# Patient Record
Sex: FEMALE | Race: BLACK OR AFRICAN AMERICAN | NOT HISPANIC OR LATINO | Employment: FULL TIME | URBAN - METROPOLITAN AREA
[De-identification: names, ages, dates, MRNs, and addresses within clinical notes are randomized per-mention and may not be internally consistent; named-entity substitution may affect disease eponyms.]

---

## 2017-10-30 ENCOUNTER — HOSPITAL ENCOUNTER (EMERGENCY)
Facility: HOSPITAL | Age: 37
Discharge: HOME | End: 2017-10-30
Attending: EMERGENCY MEDICINE
Payer: COMMERCIAL

## 2017-10-30 VITALS
RESPIRATION RATE: 17 BRPM | OXYGEN SATURATION: 100 % | DIASTOLIC BLOOD PRESSURE: 62 MMHG | HEART RATE: 89 BPM | TEMPERATURE: 98 F | BODY MASS INDEX: 28.99 KG/M2 | HEIGHT: 65 IN | WEIGHT: 174 LBS | SYSTOLIC BLOOD PRESSURE: 121 MMHG

## 2017-10-30 DIAGNOSIS — Z34.90 PREGNANCY, UNSPECIFIED GESTATIONAL AGE: Primary | ICD-10-CM

## 2017-10-30 DIAGNOSIS — J06.9 UPPER RESPIRATORY TRACT INFECTION, UNSPECIFIED TYPE: ICD-10-CM

## 2017-10-30 LAB
B-HCG UR QL: POSITIVE
BILIRUB UR QL STRIP: NEGATIVE
CLARITY UR REFRACT.AUTO: CLEAR
COLOR UR AUTO: YELLOW
GLUCOSE UR QL STRIP: NEGATIVE
HGB UR QL STRIP: NEGATIVE
KETONES UR QL STRIP: NEGATIVE
LEUKOCYTE ESTERASE UR QL STRIP: NEGATIVE
NITRITE UR QL STRIP: NEGATIVE
PH UR STRIP: 7 [PH] (ref 5–8)
PROT UR QL STRIP: NEGATIVE
SP GR UR STRIP: 1 (ref 1–1.03)
URN SPEC COLLECT METH UR: NORMAL
UROBILINOGEN UR STRIP-ACNC: NEGATIVE EU/DL

## 2017-10-30 PROCEDURE — 81025 URINE PREGNANCY TEST: CPT

## 2017-10-30 PROCEDURE — 81003 URINALYSIS AUTO W/O SCOPE: CPT

## 2017-10-30 PROCEDURE — 99283 EMERGENCY DEPT VISIT LOW MDM: CPT | Mod: 25

## 2017-10-30 PROCEDURE — 25000003 PHARM REV CODE 250: Performed by: EMERGENCY MEDICINE

## 2017-10-30 RX ORDER — ACETAMINOPHEN 500 MG
1000 TABLET ORAL
Status: COMPLETED | OUTPATIENT
Start: 2017-10-30 | End: 2017-10-30

## 2017-10-30 RX ADMIN — ACETAMINOPHEN 1000 MG: 500 TABLET ORAL at 03:10

## 2017-10-30 NOTE — ED NOTES
Patient identifiers verified and correct for Long Martin.    LOC: The patient is awake, alert and aware of environment with an appropriate affect, the patient is oriented x 3 and speaking appropriately.  APPEARANCE: Patient resting comfortably and in no acute distress, patient is clean and well groomed, patient's clothing is properly fastened.  SKIN: The skin is warm and dry, color consistent with ethnicity, patient has normal skin turgor and moist mucus membranes, skin intact, no breakdown or bruising noted.  MUSCULOSKELETAL: Patient moving all extremities spontaneously, no obvious swelling or deformities noted.  RESPIRATORY: Airway is open and patent, respirations are spontaneous, patient has a normal effort and rate, no accessory muscle use noted, clear bilateral breath sounds noted through out the chest.  ABDOMEN: Soft and non tender to palpation, no distention noted, normoactive bowel sounds present in all four quadrants.

## 2017-10-30 NOTE — ED TRIAGE NOTES
PT reports lower abdominal, nausea, lower back pain, frequent urination, nasal congestion and watery eyes x 3 days. Pt denies vomiting or diarrhea.

## 2017-10-30 NOTE — ED PROVIDER NOTES
Encounter Date: 10/30/2017       History     Chief Complaint   Patient presents with    Abdominal Pain     PT reports lower abdominal, nausea, lower back pain, frequent urination, nasal congestion and watery eyes x 3 days. Pt denies vomiting or diarrhea.     Patient presents with occasional lower abdominal pain nausea and mild low back pain.  She was supposed to start her menstrual cycle a few days ago and has not started.  No aggravating or relieving factors.  She denies vaginal bleeding and vaginal discharge.  She also has some nasal congestion and watery eyes for the past 3 days.  She reports no chest pain or shortness of breath.          Review of patient's allergies indicates:  No Known Allergies  History reviewed. No pertinent past medical history.  History reviewed. No pertinent surgical history.  History reviewed. No pertinent family history.  Social History   Substance Use Topics    Smoking status: Never Smoker    Smokeless tobacco: Never Used    Alcohol use Yes      Comment: occasionally     Review of Systems   Constitutional: Negative for fever.   HENT: Positive for congestion. Negative for sore throat.    Respiratory: Negative for shortness of breath.    Cardiovascular: Negative for chest pain.   Gastrointestinal: Negative for nausea.   Genitourinary: Negative for dysuria, vaginal bleeding, vaginal discharge and vaginal pain.   Musculoskeletal: Negative for back pain.   Skin: Negative for rash.   Neurological: Negative for weakness.   Hematological: Does not bruise/bleed easily.       Physical Exam     Initial Vitals [10/30/17 1433]   BP Pulse Resp Temp SpO2   121/62 96 20 97.6 °F (36.4 °C) 100 %      MAP       81.67         Physical Exam    Nursing note and vitals reviewed.  Constitutional: She appears well-developed and well-nourished.   HENT:   Head: Normocephalic and atraumatic.   Eyes: Conjunctivae and EOM are normal.   Neck: Normal range of motion. Neck supple.   Cardiovascular: Normal rate,  regular rhythm, normal heart sounds and intact distal pulses.   Pulmonary/Chest: Breath sounds normal. No respiratory distress. She has no wheezes. She has no rhonchi. She has no rales.   Abdominal: Soft. Bowel sounds are normal. She exhibits no distension and no mass. There is no tenderness. There is no rebound and no guarding.   Musculoskeletal: Normal range of motion.   Neurological: She is alert and oriented to person, place, and time. She has normal strength.   Skin: Skin is warm and dry.   Psychiatric: She has a normal mood and affect. Thought content normal.         ED Course   Procedures  Labs Reviewed   URINALYSIS   PREGNANCY TEST, URINE RAPID             Medical Decision Making:   ED Management:  Patient LOOKS great, she does have a positive pregnancy test and will follow up with OB/GYN.  Her menstrual cycles are irregular so I am not sure what her est due date is at this time.  This can be worked up by OB/GYN on an outpatient basis patient does not have any vaginal bleeding or abdominal pain on exam.                   ED Course      Clinical Impression:   The primary encounter diagnosis was Pregnancy, unspecified gestational age. A diagnosis of Upper respiratory tract infection, unspecified type was also pertinent to this visit.    Disposition:   Disposition: Discharged  Condition: Stable                        Anette Guerra Do, MD  10/30/17 0759

## 2017-10-30 NOTE — DISCHARGE INSTRUCTIONS
YOU CAN TAKE SUDAFED OR TYLENOL SINUS OR TYLENOL COLD AND SINUS/COLD FLU AND ROBITUSSIN DURING PREGNANCY.  DO NOT TAKE ANY IBUPROFEN PRODUCTS DURING PREGNANCY

## 2018-03-27 ENCOUNTER — HOSPITAL ENCOUNTER (OUTPATIENT)
Facility: HOSPITAL | Age: 38
Discharge: HOME | End: 2018-03-28
Attending: INTERNAL MEDICINE | Admitting: INTERNAL MEDICINE
Payer: COMMERCIAL

## 2018-03-27 ENCOUNTER — HOSPITAL ENCOUNTER (EMERGENCY)
Facility: HOSPITAL | Age: 38
Discharge: ANOTHER HOSPITAL | End: 2018-03-27
Attending: SURGERY
Payer: COMMERCIAL

## 2018-03-27 VITALS
DIASTOLIC BLOOD PRESSURE: 81 MMHG | WEIGHT: 214.75 LBS | HEART RATE: 40 BPM | HEIGHT: 66 IN | SYSTOLIC BLOOD PRESSURE: 136 MMHG | BODY MASS INDEX: 34.51 KG/M2 | TEMPERATURE: 98 F | RESPIRATION RATE: 23 BRPM | OXYGEN SATURATION: 97 %

## 2018-03-27 DIAGNOSIS — R91.8 PULMONARY NODULES/LESIONS, MULTIPLE: ICD-10-CM

## 2018-03-27 DIAGNOSIS — J81.1 PULMONARY EDEMA: Primary | ICD-10-CM

## 2018-03-27 DIAGNOSIS — J90 PLEURAL EFFUSION DUE TO ANOTHER DISORDER: ICD-10-CM

## 2018-03-27 DIAGNOSIS — J81.0 ACUTE PULMONARY EDEMA: Primary | ICD-10-CM

## 2018-03-27 DIAGNOSIS — I34.0 NONRHEUMATIC MITRAL VALVE INSUFFICIENCY: ICD-10-CM

## 2018-03-27 DIAGNOSIS — R06.02 SOB (SHORTNESS OF BREATH): ICD-10-CM

## 2018-03-27 DIAGNOSIS — R60.9 EDEMA: ICD-10-CM

## 2018-03-27 LAB
ALBUMIN SERPL BCP-MCNC: 3 G/DL
ALBUMIN SERPL BCP-MCNC: 3.5 G/DL
ALLENS TEST: ABNORMAL
ALP SERPL-CCNC: 111 U/L
ALP SERPL-CCNC: 118 U/L
ALT SERPL W/O P-5'-P-CCNC: 72 U/L
ALT SERPL W/O P-5'-P-CCNC: 77 U/L
ANION GAP SERPL CALC-SCNC: 11 MMOL/L
ANION GAP SERPL CALC-SCNC: 7 MMOL/L
AST SERPL-CCNC: 45 U/L
AST SERPL-CCNC: 56 U/L
BACTERIA #/AREA URNS AUTO: ABNORMAL /HPF
BACTERIA #/AREA URNS HPF: ABNORMAL /HPF
BASOPHILS # BLD AUTO: 0.01 K/UL
BASOPHILS # BLD AUTO: 0.02 K/UL
BASOPHILS NFR BLD: 0.2 %
BASOPHILS NFR BLD: 0.4 %
BILIRUB SERPL-MCNC: 0.3 MG/DL
BILIRUB SERPL-MCNC: 0.3 MG/DL
BILIRUB UR QL STRIP: NEGATIVE
BILIRUB UR QL STRIP: NEGATIVE
BUN SERPL-MCNC: 13 MG/DL
BUN SERPL-MCNC: 17 MG/DL
CALCIUM SERPL-MCNC: 8.8 MG/DL
CALCIUM SERPL-MCNC: 9.1 MG/DL
CHLORIDE SERPL-SCNC: 108 MMOL/L
CHLORIDE SERPL-SCNC: 111 MMOL/L
CLARITY UR REFRACT.AUTO: ABNORMAL
CLARITY UR: CLEAR
CO2 SERPL-SCNC: 23 MMOL/L
CO2 SERPL-SCNC: 25 MMOL/L
COLOR UR AUTO: YELLOW
COLOR UR: ABNORMAL
CREAT SERPL-MCNC: 0.73 MG/DL
CREAT SERPL-MCNC: 0.8 MG/DL
CREAT UR-MCNC: 27 MG/DL
D DIMER PPP FEU-MCNC: >5250 NG/ML
DELSYS: ABNORMAL
DIFFERENTIAL METHOD: ABNORMAL
DIFFERENTIAL METHOD: ABNORMAL
EOSINOPHIL # BLD AUTO: 0.2 K/UL
EOSINOPHIL # BLD AUTO: 0.2 K/UL
EOSINOPHIL NFR BLD: 3.2 %
EOSINOPHIL NFR BLD: 4.2 %
ERYTHROCYTE [DISTWIDTH] IN BLOOD BY AUTOMATED COUNT: 16.5 %
ERYTHROCYTE [DISTWIDTH] IN BLOOD BY AUTOMATED COUNT: 16.8 %
EST. GFR  (AFRICAN AMERICAN): >60 ML/MIN/1.73 M^2
EST. GFR  (AFRICAN AMERICAN): >60 ML/MIN/1.73 M^2
EST. GFR  (NON AFRICAN AMERICAN): >60 ML/MIN/1.73 M^2
EST. GFR  (NON AFRICAN AMERICAN): >60 ML/MIN/1.73 M^2
GLUCOSE SERPL-MCNC: 60 MG/DL
GLUCOSE SERPL-MCNC: 72 MG/DL
GLUCOSE UR QL STRIP: NEGATIVE
GLUCOSE UR QL STRIP: NEGATIVE
HCO3 UR-SCNC: 20.4 MMOL/L (ref 24–28)
HCT VFR BLD AUTO: 38.9 %
HCT VFR BLD AUTO: 41.1 %
HGB BLD-MCNC: 12.5 G/DL
HGB BLD-MCNC: 13.5 G/DL
HGB UR QL STRIP: ABNORMAL
HGB UR QL STRIP: ABNORMAL
KETONES UR QL STRIP: NEGATIVE
KETONES UR QL STRIP: NEGATIVE
LACTATE SERPL-SCNC: 0.6 MMOL/L
LEUKOCYTE ESTERASE UR QL STRIP: ABNORMAL
LEUKOCYTE ESTERASE UR QL STRIP: ABNORMAL
LYMPHOCYTES # BLD AUTO: 1.3 K/UL
LYMPHOCYTES # BLD AUTO: 1.4 K/UL
LYMPHOCYTES NFR BLD: 24.8 %
LYMPHOCYTES NFR BLD: 27.4 %
MCH RBC QN AUTO: 29.2 PG
MCH RBC QN AUTO: 29.6 PG
MCHC RBC AUTO-ENTMCNC: 32.1 G/DL
MCHC RBC AUTO-ENTMCNC: 32.8 G/DL
MCV RBC AUTO: 89 FL
MCV RBC AUTO: 92 FL
MICROSCOPIC COMMENT: ABNORMAL
MICROSCOPIC COMMENT: ABNORMAL
MONOCYTES # BLD AUTO: 0.8 K/UL
MONOCYTES # BLD AUTO: 0.8 K/UL
MONOCYTES NFR BLD: 15.3 %
MONOCYTES NFR BLD: 16.1 %
NEUTROPHILS # BLD AUTO: 2.6 K/UL
NEUTROPHILS # BLD AUTO: 2.9 K/UL
NEUTROPHILS NFR BLD: 53.1 %
NEUTROPHILS NFR BLD: 54.9 %
NITRITE UR QL STRIP: NEGATIVE
NITRITE UR QL STRIP: NEGATIVE
NT-PROBNP: 420 PG/ML
PCO2 BLDA: 31.7 MMHG (ref 35–45)
PH SMN: 7.42 [PH] (ref 7.35–7.45)
PH UR STRIP: 5 [PH] (ref 5–8)
PH UR STRIP: 7 [PH] (ref 5–8)
PLATELET # BLD AUTO: 273 K/UL
PLATELET # BLD AUTO: 274 K/UL
PMV BLD AUTO: 8.5 FL
PMV BLD AUTO: 8.8 FL
PO2 BLDA: 77 MMHG (ref 80–100)
POC BE: -4 MMOL/L
POC SATURATED O2: 96 % (ref 95–100)
POC TCO2: 21 MMOL/L (ref 23–27)
POTASSIUM SERPL-SCNC: 3.9 MMOL/L
POTASSIUM SERPL-SCNC: 4 MMOL/L
PROT SERPL-MCNC: 7.1 G/DL
PROT SERPL-MCNC: 7.2 G/DL
PROT UR QL STRIP: NEGATIVE
PROT UR QL STRIP: NEGATIVE
PROT UR-MCNC: <7 MG/DL
PROT/CREAT RATIO, UR: NORMAL
RBC # BLD AUTO: 4.23 M/UL
RBC # BLD AUTO: 4.62 M/UL
RBC #/AREA URNS AUTO: 3 /HPF (ref 0–4)
RBC #/AREA URNS HPF: 1 /HPF (ref 0–4)
SAMPLE: ABNORMAL
SITE: ABNORMAL
SODIUM SERPL-SCNC: 140 MMOL/L
SODIUM SERPL-SCNC: 145 MMOL/L
SP GR UR STRIP: 1.01 (ref 1–1.03)
SP GR UR STRIP: 1.01 (ref 1–1.03)
SQUAMOUS #/AREA URNS AUTO: 4 /HPF
SQUAMOUS #/AREA URNS HPF: 5 /HPF
URN SPEC COLLECT METH UR: ABNORMAL
URN SPEC COLLECT METH UR: ABNORMAL
UROBILINOGEN UR STRIP-ACNC: NEGATIVE EU/DL
UROBILINOGEN UR STRIP-ACNC: NEGATIVE EU/DL
WBC # BLD AUTO: 4.97 K/UL
WBC # BLD AUTO: 5.24 K/UL
WBC #/AREA URNS AUTO: 25 /HPF (ref 0–5)
WBC #/AREA URNS HPF: 5 /HPF (ref 0–5)

## 2018-03-27 PROCEDURE — 81000 URINALYSIS NONAUTO W/SCOPE: CPT | Mod: 91

## 2018-03-27 PROCEDURE — G0378 HOSPITAL OBSERVATION PER HR: HCPCS

## 2018-03-27 PROCEDURE — 83036 HEMOGLOBIN GLYCOSYLATED A1C: CPT

## 2018-03-27 PROCEDURE — 93010 ELECTROCARDIOGRAM REPORT: CPT | Mod: ,,, | Performed by: INTERNAL MEDICINE

## 2018-03-27 PROCEDURE — 93005 ELECTROCARDIOGRAM TRACING: CPT

## 2018-03-27 PROCEDURE — 85025 COMPLETE CBC W/AUTO DIFF WBC: CPT | Mod: 91

## 2018-03-27 PROCEDURE — 85025 COMPLETE CBC W/AUTO DIFF WBC: CPT

## 2018-03-27 PROCEDURE — 63600175 PHARM REV CODE 636 W HCPCS: Performed by: SURGERY

## 2018-03-27 PROCEDURE — 81000 URINALYSIS NONAUTO W/SCOPE: CPT

## 2018-03-27 PROCEDURE — 36415 COLL VENOUS BLD VENIPUNCTURE: CPT

## 2018-03-27 PROCEDURE — 36600 WITHDRAWAL OF ARTERIAL BLOOD: CPT

## 2018-03-27 PROCEDURE — 93306 TTE W/DOPPLER COMPLETE: CPT

## 2018-03-27 PROCEDURE — 99900035 HC TECH TIME PER 15 MIN (STAT)

## 2018-03-27 PROCEDURE — 96374 THER/PROPH/DIAG INJ IV PUSH: CPT

## 2018-03-27 PROCEDURE — 80053 COMPREHEN METABOLIC PANEL: CPT

## 2018-03-27 PROCEDURE — 80053 COMPREHEN METABOLIC PANEL: CPT | Mod: 91

## 2018-03-27 PROCEDURE — 87040 BLOOD CULTURE FOR BACTERIA: CPT | Mod: 59

## 2018-03-27 PROCEDURE — 25500020 PHARM REV CODE 255: Performed by: SURGERY

## 2018-03-27 PROCEDURE — 87086 URINE CULTURE/COLONY COUNT: CPT

## 2018-03-27 PROCEDURE — G0379 DIRECT REFER HOSPITAL OBSERV: HCPCS

## 2018-03-27 PROCEDURE — 25000003 PHARM REV CODE 250: Performed by: SURGERY

## 2018-03-27 PROCEDURE — 27000221 HC OXYGEN, UP TO 24 HOURS

## 2018-03-27 PROCEDURE — 96361 HYDRATE IV INFUSION ADD-ON: CPT

## 2018-03-27 PROCEDURE — 85379 FIBRIN DEGRADATION QUANT: CPT

## 2018-03-27 PROCEDURE — 63600175 PHARM REV CODE 636 W HCPCS: Performed by: INTERNAL MEDICINE

## 2018-03-27 PROCEDURE — 83605 ASSAY OF LACTIC ACID: CPT

## 2018-03-27 PROCEDURE — 99285 EMERGENCY DEPT VISIT HI MDM: CPT | Mod: 25

## 2018-03-27 PROCEDURE — 84156 ASSAY OF PROTEIN URINE: CPT

## 2018-03-27 PROCEDURE — 25000003 PHARM REV CODE 250: Performed by: INTERNAL MEDICINE

## 2018-03-27 PROCEDURE — 83880 ASSAY OF NATRIURETIC PEPTIDE: CPT

## 2018-03-27 PROCEDURE — 82803 BLOOD GASES ANY COMBINATION: CPT

## 2018-03-27 PROCEDURE — 93306 TTE W/DOPPLER COMPLETE: CPT | Mod: 26,,, | Performed by: INTERNAL MEDICINE

## 2018-03-27 RX ORDER — FUROSEMIDE 10 MG/ML
40 INJECTION INTRAMUSCULAR; INTRAVENOUS
Status: COMPLETED | OUTPATIENT
Start: 2018-03-27 | End: 2018-03-27

## 2018-03-27 RX ORDER — HYDRALAZINE HYDROCHLORIDE 20 MG/ML
10 INJECTION INTRAMUSCULAR; INTRAVENOUS EVERY 8 HOURS PRN
Status: DISCONTINUED | OUTPATIENT
Start: 2018-03-27 | End: 2018-03-28 | Stop reason: HOSPADM

## 2018-03-27 RX ORDER — HYDROCODONE BITARTRATE AND ACETAMINOPHEN 5; 325 MG/1; MG/1
1 TABLET ORAL
Status: COMPLETED | OUTPATIENT
Start: 2018-03-27 | End: 2018-03-27

## 2018-03-27 RX ORDER — FUROSEMIDE 10 MG/ML
20 INJECTION INTRAMUSCULAR; INTRAVENOUS ONCE
Status: COMPLETED | OUTPATIENT
Start: 2018-03-27 | End: 2018-03-27

## 2018-03-27 RX ORDER — PRENATAL WITH FERROUS FUM AND FOLIC ACID 3080; 920; 120; 400; 22; 1.84; 3; 20; 10; 1; 12; 200; 27; 25; 2 [IU]/1; [IU]/1; MG/1; [IU]/1; MG/1; MG/1; MG/1; MG/1; MG/1; MG/1; UG/1; MG/1; MG/1; MG/1; MG/1
1 TABLET ORAL DAILY
Status: DISCONTINUED | OUTPATIENT
Start: 2018-03-28 | End: 2018-03-28 | Stop reason: HOSPADM

## 2018-03-27 RX ORDER — IBUPROFEN 200 MG
16 TABLET ORAL
Status: DISCONTINUED | OUTPATIENT
Start: 2018-03-27 | End: 2018-03-28 | Stop reason: HOSPADM

## 2018-03-27 RX ORDER — GLUCAGON 1 MG
1 KIT INJECTION
Status: DISCONTINUED | OUTPATIENT
Start: 2018-03-27 | End: 2018-03-28 | Stop reason: HOSPADM

## 2018-03-27 RX ORDER — ACETAMINOPHEN 325 MG/1
650 TABLET ORAL EVERY 4 HOURS PRN
Status: DISCONTINUED | OUTPATIENT
Start: 2018-03-27 | End: 2018-03-28 | Stop reason: HOSPADM

## 2018-03-27 RX ORDER — IBUPROFEN 600 MG/1
600 TABLET ORAL
Status: COMPLETED | OUTPATIENT
Start: 2018-03-27 | End: 2018-03-27

## 2018-03-27 RX ORDER — IBUPROFEN 200 MG
24 TABLET ORAL
Status: DISCONTINUED | OUTPATIENT
Start: 2018-03-27 | End: 2018-03-28 | Stop reason: HOSPADM

## 2018-03-27 RX ORDER — CLONIDINE HYDROCHLORIDE 0.1 MG/1
0.1 TABLET ORAL
Status: COMPLETED | OUTPATIENT
Start: 2018-03-27 | End: 2018-03-27

## 2018-03-27 RX ORDER — SODIUM CHLORIDE 0.9 % (FLUSH) 0.9 %
5 SYRINGE (ML) INJECTION
Status: DISCONTINUED | OUTPATIENT
Start: 2018-03-27 | End: 2018-03-28 | Stop reason: HOSPADM

## 2018-03-27 RX ADMIN — SODIUM CHLORIDE 1000 ML: 0.9 INJECTION, SOLUTION INTRAVENOUS at 06:03

## 2018-03-27 RX ADMIN — ACETAMINOPHEN 650 MG: 325 TABLET, FILM COATED ORAL at 03:03

## 2018-03-27 RX ADMIN — IBUPROFEN 600 MG: 600 TABLET, FILM COATED ORAL at 11:03

## 2018-03-27 RX ADMIN — FUROSEMIDE 40 MG: 10 INJECTION, SOLUTION INTRAMUSCULAR; INTRAVENOUS at 12:03

## 2018-03-27 RX ADMIN — FUROSEMIDE 20 MG: 10 INJECTION, SOLUTION INTRAMUSCULAR; INTRAVENOUS at 09:03

## 2018-03-27 RX ADMIN — ACETAMINOPHEN 650 MG: 325 TABLET, FILM COATED ORAL at 09:03

## 2018-03-27 RX ADMIN — HYDROCODONE BITARTRATE AND ACETAMINOPHEN 1 TABLET: 5; 325 TABLET ORAL at 06:03

## 2018-03-27 RX ADMIN — IOHEXOL 100 ML: 350 INJECTION, SOLUTION INTRAVENOUS at 09:03

## 2018-03-27 RX ADMIN — CLONIDINE HYDROCHLORIDE 0.1 MG: 0.1 TABLET ORAL at 11:03

## 2018-03-27 NOTE — SUBJECTIVE & OBJECTIVE
History reviewed. No pertinent past medical history.    Past Surgical History:   Procedure Laterality Date     SECTION         Review of patient's allergies indicates:  No Known Allergies    Current Facility-Administered Medications on File Prior to Encounter   Medication    [COMPLETED] cloNIDine tablet 0.1 mg    [COMPLETED] furosemide injection 40 mg    [COMPLETED] hydrocodone-acetaminophen 5-325mg per tablet 1 tablet    [COMPLETED] ibuprofen tablet 600 mg    [COMPLETED] omnipaque 350 iohexol 100 mL    [COMPLETED] sodium chloride 0.9% bolus 1,000 mL     Current Outpatient Prescriptions on File Prior to Encounter   Medication Sig    prenatal 21-iron fu-folic acid (PRENATAL COMPLETE) 14 mg iron- 400 mcg Tab Take 1 tablet by mouth once daily.     Family History     None        Social History Main Topics    Smoking status: Never Smoker    Smokeless tobacco: Never Used    Alcohol use Yes      Comment: occasionally    Drug use: No    Sexual activity: Not on file     Review of Systems   Constitutional: Negative for chills and fever.   HENT: Negative for congestion and rhinorrhea.    Eyes: Negative for photophobia and visual disturbance.   Respiratory: Positive for shortness of breath. Negative for cough, chest tightness, wheezing and stridor.    Cardiovascular: Negative for chest pain and leg swelling.   Gastrointestinal: Positive for abdominal pain. Negative for nausea and vomiting.   Genitourinary: Negative for difficulty urinating and dysuria.   Musculoskeletal: Negative for joint swelling and neck stiffness.   Skin: Negative for rash and wound.   Neurological: Positive for headaches. Negative for dizziness and light-headedness.   Psychiatric/Behavioral: Negative for agitation and behavioral problems.     Objective:     Vital Signs (Most Recent):  Temp: 98.3 °F (36.8 °C) (18 1505)  Pulse: (!) 52 (18 1505)  Resp: 19 (18 1505)  BP: (!) 170/88 (18 1505)  SpO2: 97 % (18  1505) Vital Signs (24h Range):  Temp:  [97.8 °F (36.6 °C)-98.3 °F (36.8 °C)] 98.3 °F (36.8 °C)  Pulse:  [38-52] 52  Resp:  [19-25] 19  SpO2:  [97 %-100 %] 97 %  BP: (136-170)/() 170/88     Weight: 93.9 kg (207 lb 0.2 oz)  Body mass index is 33.41 kg/m².    Physical Exam   Constitutional: She is oriented to person, place, and time. She appears well-developed and well-nourished. No distress.   HENT:   Right Ear: External ear normal.   Left Ear: External ear normal.   Nose: Nose normal.   Eyes: EOM are normal. Pupils are equal, round, and reactive to light. No scleral icterus.   Neck: Normal range of motion. Neck supple. No thyromegaly present.   Cardiovascular: Normal rate and normal heart sounds.  Exam reveals no friction rub.    No murmur heard.  Pulmonary/Chest: Effort normal and breath sounds normal. No respiratory distress. She has no wheezes. She has no rales.   Abdominal: Soft. Bowel sounds are normal. She exhibits no mass. There is no tenderness.   Obese, surgical site healing well, c/d/i   Musculoskeletal: Normal range of motion. She exhibits no edema or deformity.   Neurological: She is alert and oriented to person, place, and time. No cranial nerve deficit.   Skin: Skin is warm and dry. No pallor.         CRANIAL NERVES     CN III, IV, VI   Pupils are equal, round, and reactive to light.  Extraocular motions are normal.        Significant Labs: All pertinent labs within the past 24 hours have been reviewed.    Significant Imaging: I have reviewed and interpreted all pertinent imaging results/findings within the past 24 hours.

## 2018-03-27 NOTE — HPI
Ms. Martin is a 39 yo  woman who presented to [facility] for shortness of breath. Her symptoms started about 5 days ago and have progressively worsened. Lying flat worsens her symptoms and her symptoms improve with sitting up. She denies cough, fever, chills.  She delivered her baby via C section on 3/19 at [facility]. She went into natural labor at 39 weeks. Her baby was developing bradycardia during contractions so she was taken for C section. She does not know if she was given tocolytics or steroids. She does not believe she was given IV fluids during her hospital stay.

## 2018-03-27 NOTE — ED NOTES
Spoke to Transfer center [facility] RN, notified the need for transfer for medicine admit telemetry bed pt is un referred. Dr Lee spoke to [facility] MD Dr. Cruz prior to transfer line notification. Pt not accepted at [place].

## 2018-03-27 NOTE — H&P
[facility] Hospital Medicine  History & Physical    Patient Name: Long Martin  MRN: [id number]  Admission Date: 3/27/2018  Attending Physician: Mary Ann Cavazos MD   Primary Care Provider: Primary Doctor No         Patient information was obtained from patient and ER records.     Subjective:     Principal Problem:Pulmonary edema    Chief Complaint: No chief complaint on file.       HPI: Ms. Martin is a 37 yo  woman who presented to [facility] for shortness of breath. Her symptoms started about 5 days ago and have progressively worsened. Lying flat worsens her symptoms and her symptoms improve with sitting up. She denies cough, fever, chills.  She delivered her baby via C section on 3/19 at [facility]. She went into natural labor at 39 weeks. Her baby was developing bradycardia during contractions so she was taken for C section. She does not know if she was given tocolytics or steroids. She does not believe she was given IV fluids during her hospital stay.    History reviewed. No pertinent past medical history.    Past Surgical History:   Procedure Laterality Date     SECTION         Review of patient's allergies indicates:  No Known Allergies    Current Facility-Administered Medications on File Prior to Encounter   Medication    [COMPLETED] cloNIDine tablet 0.1 mg    [COMPLETED] furosemide injection 40 mg    [COMPLETED] hydrocodone-acetaminophen 5-325mg per tablet 1 tablet    [COMPLETED] ibuprofen tablet 600 mg    [COMPLETED] omnipaque 350 iohexol 100 mL    [COMPLETED] sodium chloride 0.9% bolus 1,000 mL     Current Outpatient Prescriptions on File Prior to Encounter   Medication Sig    prenatal 21-iron fu-folic acid (PRENATAL COMPLETE) 14 mg iron- 400 mcg Tab Take 1 tablet by mouth once daily.     Family History     None        Social History Main Topics    Smoking status: Never Smoker    Smokeless tobacco: Never Used    Alcohol use Yes      Comment:  occasionally    Drug use: No    Sexual activity: Not on file     Review of Systems   Constitutional: Negative for chills and fever.   HENT: Negative for congestion and rhinorrhea.    Eyes: Negative for photophobia and visual disturbance.   Respiratory: Positive for shortness of breath. Negative for cough, chest tightness, wheezing and stridor.    Cardiovascular: Negative for chest pain and leg swelling.   Gastrointestinal: Positive for abdominal pain. Negative for nausea and vomiting.   Genitourinary: Negative for difficulty urinating and dysuria.   Musculoskeletal: Negative for joint swelling and neck stiffness.   Skin: Negative for rash and wound.   Neurological: Positive for headaches. Negative for dizziness and light-headedness.   Psychiatric/Behavioral: Negative for agitation and behavioral problems.     Objective:     Vital Signs (Most Recent):  Temp: 98.3 °F (36.8 °C) (03/27/18 1505)  Pulse: (!) 52 (03/27/18 1505)  Resp: 19 (03/27/18 1505)  BP: (!) 170/88 (03/27/18 1505)  SpO2: 97 % (03/27/18 1505) Vital Signs (24h Range):  Temp:  [97.8 °F (36.6 °C)-98.3 °F (36.8 °C)] 98.3 °F (36.8 °C)  Pulse:  [38-52] 52  Resp:  [19-25] 19  SpO2:  [97 %-100 %] 97 %  BP: (136-170)/() 170/88     Weight: 93.9 kg (207 lb 0.2 oz)  Body mass index is 33.41 kg/m².    Physical Exam   Constitutional: She is oriented to person, place, and time. She appears well-developed and well-nourished. No distress.   HENT:   Right Ear: External ear normal.   Left Ear: External ear normal.   Nose: Nose normal.   Eyes: EOM are normal. Pupils are equal, round, and reactive to light. No scleral icterus.   Neck: Normal range of motion. Neck supple. No thyromegaly present.   Cardiovascular: Normal rate and normal heart sounds.  Exam reveals no friction rub.    No murmur heard.  Pulmonary/Chest: Effort normal and breath sounds normal. No respiratory distress. She has no wheezes. She has no rales.   Abdominal: Soft. Bowel sounds are normal. She  exhibits no mass. There is no tenderness.   Obese, surgical site healing well, c/d/i   Musculoskeletal: Normal range of motion. She exhibits no edema or deformity.   Neurological: She is alert and oriented to person, place, and time. No cranial nerve deficit.   Skin: Skin is warm and dry. No pallor.         CRANIAL NERVES     CN III, IV, VI   Pupils are equal, round, and reactive to light.  Extraocular motions are normal.        Significant Labs: All pertinent labs within the past 24 hours have been reviewed.    Significant Imaging: I have reviewed and interpreted all pertinent imaging results/findings within the past 24 hours.    Assessment/Plan:     * Pulmonary edema    CTA negative for PE but demonstrated edema which is likely the etiology of her symptoms.   My suspicion for pneumonia is low given absence of fever/cough/leukocytosis.   Possibly iatrogenic volume overload given recent surgery and hospitalization. Check echocardiogram. NTproBNP upper limit normal.  Nodules also noted?  Consult ob/gyn and pulm.   O2 sats satisfactory on room air.        Hypertension affecting pregnancy, delivered, current hospitalization    No history of hypertension. PRN labetalol and monitor.           VTE Risk Mitigation         Ordered     IP VTE LOW RISK PATIENT  Once      03/27/18 6970             Mary Ann Cavazos MD  Department of Hospital Medicine   [facility]

## 2018-03-27 NOTE — ED PROVIDER NOTES
Encounter Date: 3/27/2018       History     Chief Complaint   Patient presents with    Shortness of Breath     c/o SOB x 3 days worsening with lying flat; O2 sat 97% on RA; reports having baby via  1 week ago    Headache     L frontal HA x 3 days; denies N/V      Patient presents with chief complaint of shortness of breath especially when she is lying flat for last 3 days.  She underwent a  at [facility] and was discharged on Thursday.  She developed symptoms starting on Saturday she states that she doesn't have any chest pain or running high fever.  She had an uncomplicated .  She has no history of lung disease      The history is provided by the patient.   Shortness of Breath   This is a new problem. The average episode lasts 3 days. The problem occurs intermittently.The current episode started more than 2 days ago. The problem has been gradually worsening. Associated symptoms include orthopnea. Pertinent negatives include no fever, no cough, no wheezing and no leg swelling. She has tried nothing for the symptoms. She has had no prior hospitalizations. She has had no prior ED visits. She has had no prior ICU admissions. Associated medical issues do not include pneumonia, PE, CAD, heart failure or DVT.     Review of patient's allergies indicates:  No Known Allergies  History reviewed. No pertinent past medical history.  Past Surgical History:   Procedure Laterality Date     SECTION       History reviewed. No pertinent family history.  Social History   Substance Use Topics    Smoking status: Never Smoker    Smokeless tobacco: Never Used    Alcohol use Yes      Comment: occasionally     Review of Systems   Constitutional: Negative for activity change and fever.   HENT: Negative.    Eyes: Negative.    Respiratory: Positive for shortness of breath. Negative for cough and wheezing.    Cardiovascular: Positive for orthopnea. Negative for leg swelling.   Gastrointestinal:  Negative.    Endocrine: Negative.    Genitourinary: Negative.    Musculoskeletal: Negative.    Skin: Negative.    Allergic/Immunologic: Negative.    Hematological: Negative.    Psychiatric/Behavioral: Negative.    All other systems reviewed and are negative.      Physical Exam     Initial Vitals [03/27/18 0607]   BP Pulse Resp Temp SpO2   (!) 149/89 (!) 50 20 97.8 °F (36.6 °C) 97 %      MAP       109         Physical Exam    Nursing note and vitals reviewed.  Constitutional: She appears well-developed and well-nourished.   HENT:   Head: Normocephalic.   Eyes: Conjunctivae are normal.   Neck: Normal range of motion. Neck supple.   Cardiovascular: Normal rate and regular rhythm.   Pulmonary/Chest:   Diminished at both bases   Abdominal: Soft.   Neurological: She is alert and oriented to person, place, and time.   Skin: Skin is warm and dry.   Psychiatric: She has a normal mood and affect.         ED Course   Critical Care  Date/Time: 3/27/2018 11:08 AM  Performed by: RICARDO ROSE III  Authorized by: RICARDO ROSE III   Direct patient critical care time: 60 minutes  Additional history critical care time: 10 minutes  Ordering / reviewing critical care time: 10 minutes  Documentation critical care time: 20 minutes  Consulting other physicians critical care time: 10 minutes  Total critical care time (exclusive of procedural time) : 110 minutes        Labs Reviewed   CBC W/ AUTO DIFFERENTIAL - Abnormal; Notable for the following:        Result Value    RDW 16.8 (*)     MPV 8.5 (*)     Mono% 15.3 (*)     All other components within normal limits   COMPREHENSIVE METABOLIC PANEL - Abnormal; Notable for the following:     Chloride 111 (*)     AST 56 (*)     ALT 77 (*)     All other components within normal limits   URINALYSIS - Abnormal; Notable for the following:     Appearance, UA Hazy (*)     Occult Blood UA 2+ (*)     Leukocytes, UA 3+ (*)     All other components within normal limits   D DIMER - Abnormal;  Notable for the following:     D-Dimer >5250 (*)     All other components within normal limits   URINALYSIS MICROSCOPIC - Abnormal; Notable for the following:     WBC, UA 25 (*)     Bacteria, UA Many (*)     All other components within normal limits   CULTURE, BLOOD   CULTURE, BLOOD   LACTIC ACID, PLASMA   NT-PRO NATRIURETIC PEPTIDE     EKG Readings: (Independently Interpreted)   Rhythm: Sinus Bradycardia.          Medical Decision Making:   Initial Assessment:   Pneumonia  ED Management:  Patient has pulmonary edema from an unknown cause.  She has multiple bilateral nodules bilateral pleural effusions as well as ascites underneath the diaphragm.  She does not have a history of coronary artery disease her BNP is within normal limits her d-dimer showed was greater than 5000 but there is no evidence of any pulmonary embolus her lactic acid was normal she has a normal white cell count  impression is pulmonary edema from an unknown cause one week status post  I talked to Dr. Cruz at [facility] and he felt uncomfortable taking care of a postop OB patient and would recommend that patient go to [facility].  [facility] did not return our calls oxygen transfer center was called and they arranged transfer to the [facility].  I talked to the accepting doctor and she recommended that the patient be placed in observation for a thorough evaluation  Other:   I have discussed this case with another health care provider.                      Clinical Impression:   The primary encounter diagnosis was Acute pulmonary edema. Diagnoses of SOB (shortness of breath), Pleural effusion due to another disorder, and Pulmonary nodules/lesions, multiple were also pertinent to this visit.                           RICARDO Lee III, MD  [phone]

## 2018-03-27 NOTE — NURSING
Pt arrive to the unit by stretcher accompanied by EMS.  Assisted pt to bed. Tele monitoring initiated.  Admit assessment initiated.  Pt is AAOx4.  No distress noted.

## 2018-03-27 NOTE — ED NOTES
[facility] here for transport. Pt amb to restroom to void approx 500cc urine. Pt then ambulated to [facility] stretcher.

## 2018-03-27 NOTE — ED NOTES
PH   7.416    PCO2   31.7    PO2    77    BE   -4    HCO3     20.4    TCO2   21    sO2     96      Results reported to Dr. Claude Craighead in Respiratory alcove on 3/27/2018 @ 1247 by [facility].  ABG drawn on Room Air.

## 2018-03-27 NOTE — ED NOTES
"Pt states she had a csection done on 3/19/2018 at [facility] due to decelerations in baby's heart rate following planned induction on the 3/18. States her original due date was 3/24/2018. Pt denies complications after  procedure. States she became SOB 2 days ago and "it has gotten worse, especially when I move". Resp even non-labored at rest, diminished bilaterally at bases.   "

## 2018-03-28 VITALS
TEMPERATURE: 99 F | DIASTOLIC BLOOD PRESSURE: 81 MMHG | OXYGEN SATURATION: 97 % | WEIGHT: 207.69 LBS | HEIGHT: 66 IN | RESPIRATION RATE: 18 BRPM | HEART RATE: 57 BPM | BODY MASS INDEX: 33.38 KG/M2 | SYSTOLIC BLOOD PRESSURE: 123 MMHG

## 2018-03-28 LAB
ALBUMIN SERPL BCP-MCNC: 2.8 G/DL
ALP SERPL-CCNC: 109 U/L
ALT SERPL W/O P-5'-P-CCNC: 64 U/L
ANION GAP SERPL CALC-SCNC: 9 MMOL/L
AST SERPL-CCNC: 36 U/L
BASOPHILS # BLD AUTO: 0.02 K/UL
BASOPHILS NFR BLD: 0.4 %
BILIRUB SERPL-MCNC: 0.2 MG/DL
BUN SERPL-MCNC: 14 MG/DL
CALCIUM SERPL-MCNC: 8.8 MG/DL
CHLORIDE SERPL-SCNC: 108 MMOL/L
CO2 SERPL-SCNC: 25 MMOL/L
CREAT SERPL-MCNC: 0.8 MG/DL
DIFFERENTIAL METHOD: ABNORMAL
EOSINOPHIL # BLD AUTO: 0.3 K/UL
EOSINOPHIL NFR BLD: 4.7 %
ERYTHROCYTE [DISTWIDTH] IN BLOOD BY AUTOMATED COUNT: 16.5 %
EST. GFR  (AFRICAN AMERICAN): >60 ML/MIN/1.73 M^2
EST. GFR  (NON AFRICAN AMERICAN): >60 ML/MIN/1.73 M^2
ESTIMATED AVG GLUCOSE: 94 MG/DL
ESTIMATED PA SYSTOLIC PRESSURE: 40.49
GLUCOSE SERPL-MCNC: 74 MG/DL
HBA1C MFR BLD HPLC: 4.9 %
HCT VFR BLD AUTO: 37.7 %
HGB BLD-MCNC: 12.3 G/DL
LYMPHOCYTES # BLD AUTO: 1.5 K/UL
LYMPHOCYTES NFR BLD: 27.5 %
MCH RBC QN AUTO: 29.4 PG
MCHC RBC AUTO-ENTMCNC: 32.6 G/DL
MCV RBC AUTO: 90 FL
MITRAL VALVE REGURGITATION: ABNORMAL
MONOCYTES # BLD AUTO: 0.9 K/UL
MONOCYTES NFR BLD: 17.2 %
NEUTROPHILS # BLD AUTO: 2.7 K/UL
NEUTROPHILS NFR BLD: 50 %
PLATELET # BLD AUTO: 299 K/UL
PMV BLD AUTO: 8.7 FL
POTASSIUM SERPL-SCNC: 3.8 MMOL/L
PROT SERPL-MCNC: 6.8 G/DL
RBC # BLD AUTO: 4.18 M/UL
RETIRED EF AND QEF - SEE NOTES: 55 (ref 55–65)
SODIUM SERPL-SCNC: 142 MMOL/L
TRICUSPID VALVE REGURGITATION: ABNORMAL
WBC # BLD AUTO: 5.34 K/UL

## 2018-03-28 PROCEDURE — 25000003 PHARM REV CODE 250: Performed by: INTERNAL MEDICINE

## 2018-03-28 PROCEDURE — G0378 HOSPITAL OBSERVATION PER HR: HCPCS

## 2018-03-28 PROCEDURE — 36415 COLL VENOUS BLD VENIPUNCTURE: CPT

## 2018-03-28 PROCEDURE — 96374 THER/PROPH/DIAG INJ IV PUSH: CPT

## 2018-03-28 PROCEDURE — 85025 COMPLETE CBC W/AUTO DIFF WBC: CPT

## 2018-03-28 PROCEDURE — 80053 COMPREHEN METABOLIC PANEL: CPT

## 2018-03-28 RX ADMIN — PRENATAL VIT W/ FE FUMARATE-FA TAB 27-0.8 MG 1 TABLET: 27-0.8 TAB at 08:03

## 2018-03-28 NOTE — PROGRESS NOTES
Received report from Holley VAZQUEZ. Patient is awake and alert in bed. She reports a mild headache at this time. No distress noted. Shift assessment initiated.

## 2018-03-28 NOTE — PROGRESS NOTES
TN contacted Dr. Winter' office at [phone] to establish care and schedule a PCP f/u appt; answering machine will convey to staff. Pt will schedule the appt in 1 week. Aunt is present with pt who will provide transportation, did not want to wait until TN can schedule after 1 pm. Pt has an appt with OB/GYN, Dr. Quiros on 03/30/18.

## 2018-03-28 NOTE — PROGRESS NOTES
Discharge instructions given to patient and family at bedside. Patient and family verbalized understanding and states willingness to comply. Saline lock removed. Tele monitoring removed. Patient to family vehicle  for discharge home. Patient escorted by PCT and accompanied by family. No apparent distress noted.

## 2018-03-28 NOTE — PROGRESS NOTES
WRITTEN HEALTH INFORMATION ON PULMONARY EDEMA      Call 911  Call 911 if any of these occur:  · Chest pain  · Severe trouble breathing  · Coughing up blood  · Skin turns blue      When to call the healthcare provider  Call your the healthcare provider right away if you have any of the following:  · Unusual or irregular heartbeat  · Unable to speak full sentences before running out of breath  · Sweating more than usual

## 2018-03-28 NOTE — PROGRESS NOTES
"EDUCATION:  TN provided with educational information on Pulmonary Edema.  Information reviewed and placed in :My Healthcare Packet" to be brought home for pt to use as resource after discharge.  Information included:  signs and symptoms to look for and call the doctor if experiencing, and symptoms that may indicate a medical emergency: CALL 911.    Reminded pt things she will be responsible for to manage her healthcare at home: getting Rx filled, attending follow up appointments, and taking medication as prescribed were discussed.   Teach back method used.  All questions answered.  Patient verbalized understanding of all information.      TN informed floor nurseHolley, care management   Is complete and can proceed with discharge teaching.  "

## 2018-03-28 NOTE — PLAN OF CARE
Problem: Patient Care Overview  Goal: Plan of Care Review   03/28/18 0140   Coping/Psychosocial   Plan Of Care Reviewed With patient       Problem: Hypertensive Disorders in Pregnancy (Adult,Obstetrics,Pediatric)  Intervention: Monitor Reflexes/Neurological Status   03/28/18 0140   Safety Interventions   Medication Review/Management medications reviewed     Intervention: Monitor/Manage Seizure Activity   03/28/18 0140   Safety Interventions   Seizure Precautions clutter-free environment maintained       Goal: Signs and Symptoms of Listed Potential Problems Will be Absent, Minimized or Managed (Hypertensive Disorders in Pregnancy)  Signs and symptoms of listed potential problems will be absent, minimized or managed by discharge/transition of care (reference Hypertensive Disorders in Pregnancy (Adult,Obstetrics,Pediatric) CPG).    03/28/18 0140   Hypertensive Disorders in Pregnancy   Problems Assessed (Hypertensive Disorders in Pregnancy) all   Problems Present (Hypertensive Disorders in Pregnancy) pulmonary edema

## 2018-03-28 NOTE — PROGRESS NOTES
Follow-up Information     Mary Ann Quiros MD In 3 days.    Specialty:  Obstetrics  Why:  Outpatient Services OB/GYN Follow-Up Pt will schedule an appointment.  Contact information:  [address] [place] [address] [phone]             Cheikh Johnston MD.    Specialty:  Family Medicine  Why:  Outpatient Services PCP Follow-Up; Pt will schedule appointment in 1 week.  Contact information:  [address] [place] [address] [phone]               PLEASE BRING TO ALL FOLLOW UP APPOINTMENTS:   A COPY YOUR DISCHARGE INSTRUCTIONS, Any new MEDICINES YOU ARE CURRENTLY TAKING IN THEIR ORIGINAL BOTTLES  And IDENTIFICATION AND INSURANCE CARD     **PLEASE ARRIVE 15 MINUTES AHEAD OF SCHEDULED APPOINTMENT TIME   ++PLEASE CALL 24 HOURS IN ADVANCE IF YOU MUST RESCHEDULE YOUR APPOINTMENT DAY AND/OR TIME     [facility]    WRITTEN HEALTHCARE AND DISCHARGE INFORMATION                        Help at Home           [phone]  After discharge for assistance [facility] On Call Nurse Care Line 24/7  Assistance    Things You are responsible For To Manage Your Care At Home:  1.    Getting your prescriptions filled   2.    Taking your medications as directed, DO NOT MISS ANY DOSES!  3.    Going to your follow-up doctor appointment. This is important because it  allow the doctor to monitor your progress and determine if  any changes need to made to your treatment plan.     Thank you for choosing [facility] for your care.  Please answer any calls you may receive from [facility] we want to continue to support you as you manage your healthcare needs. [facility] is happy to have the opportunity to serve you.     Sincerely,  Your [facility] Team,  GEORGE Qureshi; [phone]

## 2018-03-28 NOTE — PLAN OF CARE
Problem: Patient Care Overview  Goal: Plan of Care Review  Outcome: Outcome(s) achieved Date Met: 03/28/18 03/28/18 1128   Coping/Psychosocial   Plan Of Care Reviewed With patient       Problem: Breathing Pattern Ineffective (Adult)  Goal: Anxiety/Fear Reduction  Patient will demonstrate the desired outcomes by discharge/transition of care.   Outcome: Outcome(s) achieved Date Met: 03/28/18 03/28/18 1128   Breathing Pattern Ineffective (Adult)   Anxiety/Fear Reduction achieves outcome

## 2018-03-28 NOTE — PLAN OF CARE
03/28/18 1223   Final Note   Assessment Type Final Discharge Note   Discharge Disposition Home   What phone number can be called within the next 1-3 days to see how you are doing after discharge? (Listed in chart.)   Hospital Follow Up  Appt(s) scheduled? Yes   Discharge plans and expectations educations in teach back method with documentation complete? Yes   Right Care Referral Info   Post Acute Recommendation No Care

## 2018-03-29 LAB — BACTERIA UR CULT: NORMAL

## 2018-03-29 NOTE — ASSESSMENT & PLAN NOTE
No history of hypertension. PRN labetalol and monitor. Likely stress/anxiety related. Follow up as outpatient.

## 2018-03-29 NOTE — HOSPITAL COURSE
CTA negative for PE but demonstrated edema which was likely the etiology of her symptoms.   My suspicion for pneumonia is low given absence of fever/cough/leukocytosis.   Possibly iatrogenic volume overload given recent surgery and hospitalization. She was given a dose of IV Lasix with sufficient diuresis. Echocardiogram EF 55%, biatrial enlargement and PAP 40 however no clear etiology of her pulmonary edema. NTproBNP upper limit normal but this could be related to obesity.  Nodules note on CT likely from interstitial fluid.   Consulted ob/gyn who checked urine studies to rule out preeclampsia that were normal. Dr. Reyes did not feel her symptoms were pregnancy related.   O2 sats satisfactory on room air. She was stable for discharge. She was encouraged to follow up with both her Ob later this week and establish with a PCP in the near future.

## 2018-03-29 NOTE — DISCHARGE SUMMARY
[facility] Hospital Medicine  Discharge Summary      Patient Name: Long Martin  MRN: [id number]  Admission Date: 3/27/2018  Hospital Length of Stay: 0 days  Discharge Date and Time: 3/28/2018 12:29 PM  Attending Physician: Joyce att. providers found   Discharging Provider: Mary Ann Cavazos MD  Primary Care Provider: Primary Doctor Joyce      HPI:   Ms. Martin is a 39 yo  woman who presented to [facility] for shortness of breath. Her symptoms started about 5 days ago and have progressively worsened. Lying flat worsens her symptoms and her symptoms improve with sitting up. She denies cough, fever, chills.  She delivered her baby via C section on 3/19 at [facility]. She went into natural labor at 39 weeks. Her baby was developing bradycardia during contractions so she was taken for C section. She does not know if she was given tocolytics or steroids. She does not believe she was given IV fluids during her hospital stay.    Hospital Course:   CTA negative for PE but demonstrated edema which was likely the etiology of her symptoms.   My suspicion for pneumonia is low given absence of fever/cough/leukocytosis.   Possibly iatrogenic volume overload given recent surgery and hospitalization. She was given a dose of IV Lasix with sufficient diuresis. Echocardiogram EF 55%, biatrial enlargement and PAP 40 however no clear etiology of her pulmonary edema. NTproBNP upper limit normal but this could be related to obesity.  Nodules note on CT likely from interstitial fluid.   Consulted ob/gyn who checked urine studies to rule out preeclampsia that were normal. Dr. Reyes did not feel her symptoms were pregnancy related.   O2 sats satisfactory on room air. She was stable for discharge. She was encouraged to follow up with both her Ob later this week and establish with a PCP in the near future.     Consults:     * Pulmonary edema    As above        Hypertension affecting pregnancy, delivered,  current hospitalization    No history of hypertension. PRN labetalol and monitor. Likely stress/anxiety related. Follow up as outpatient.          Final Active Diagnoses:    Diagnosis Date Noted POA    PRINCIPAL PROBLEM:  Pulmonary edema [J81.1] 03/27/2018 Yes    Hypertension affecting pregnancy, delivered, current hospitalization [O16.4] 03/27/2018 Yes      Problems Resolved During this Admission:    Diagnosis Date Noted Date Resolved POA       Discharged Condition: stable    Disposition: Home or Self Care    Follow Up:  Follow-up Information     Mary Ann Quiros MD In 3 days.    Specialty:  Obstetrics  Why:  Outpatient Services OB/GYN Follow-Up Pt will schedule an appointment.  Contact information:  [address]  [place] [address] [phone]             Cheikh Johnston MD.    Specialty:  Family Medicine  Why:  Outpatient Services PCP Follow-Up; Pt will schedule appointment in 1 week.  Contact information:  [address]   [place] [address] [phone]                 Patient Instructions:     Diet Adult Regular     Activity as tolerated     Notify your health care provider if you experience any of the following:  increased confusion or weakness     Notify your health care provider if you experience any of the following:  persistent dizziness, light-headedness, or visual disturbances     Notify your health care provider if you experience any of the following:  worsening rash     Notify your health care provider if you experience any of the following:  severe persistent headache     Notify your health care provider if you experience any of the following:  difficulty breathing or increased cough     Notify your health care provider if you experience any of the following:  redness, tenderness, or signs of infection (pain, swelling, redness, odor or green/yellow discharge around incision site)     Notify your health care provider if you experience any of the following:  severe uncontrolled pain     Notify  your health care provider if you experience any of the following:  persistent nausea and vomiting or diarrhea     Notify your health care provider if you experience any of the following:  temperature >100.4       Medications:  Reconciled Home Medications:      Medication List      CONTINUE taking these medications    prenatal 21-iron fu-folic acid 14 mg iron- 400 mcg Tab  Commonly known as:  PRENATAL COMPLETE  Take 1 tablet by mouth once daily.        Time spent on the discharge of patient: 60 minutes  Patient was seen and examined on the date of discharge and determined to be suitable for discharge.         Mary Ann Cavazos MD  Department of Hospital Medicine  [facility]

## 2018-04-01 LAB
BACTERIA BLD CULT: NORMAL
BACTERIA BLD CULT: NORMAL

## 2018-10-22 ENCOUNTER — OCCUPATIONAL HEALTH (OUTPATIENT)
Dept: URGENT CARE | Facility: CLINIC | Age: 38
End: 2018-10-22

## 2018-10-22 DIAGNOSIS — R76.11 POSITIVE PPD: Primary | ICD-10-CM

## 2018-10-22 PROCEDURE — 71045 X-RAY EXAM CHEST 1 VIEW: CPT | Mod: FY,S$GLB,, | Performed by: RADIOLOGY

## 2018-12-19 ENCOUNTER — HOSPITAL ENCOUNTER (EMERGENCY)
Facility: HOSPITAL | Age: 38
Discharge: HOME | End: 2018-12-19
Attending: EMERGENCY MEDICINE
Payer: MEDICAID

## 2018-12-19 VITALS
WEIGHT: 235 LBS | BODY MASS INDEX: 37.77 KG/M2 | OXYGEN SATURATION: 98 % | TEMPERATURE: 98 F | SYSTOLIC BLOOD PRESSURE: 126 MMHG | HEIGHT: 66 IN | HEART RATE: 92 BPM | RESPIRATION RATE: 18 BRPM | DIASTOLIC BLOOD PRESSURE: 82 MMHG

## 2018-12-19 DIAGNOSIS — K52.9 GASTROENTERITIS: Primary | ICD-10-CM

## 2018-12-19 DIAGNOSIS — R09.81 NASAL CONGESTION: ICD-10-CM

## 2018-12-19 DIAGNOSIS — N30.00 ACUTE CYSTITIS WITHOUT HEMATURIA: ICD-10-CM

## 2018-12-19 LAB
ALBUMIN SERPL BCP-MCNC: 4 G/DL
ALP SERPL-CCNC: 82 U/L
ALT SERPL W/O P-5'-P-CCNC: 23 U/L
ANION GAP SERPL CALC-SCNC: 5 MMOL/L
AST SERPL-CCNC: 25 U/L
B-HCG UR QL: NEGATIVE
BACTERIA #/AREA URNS AUTO: ABNORMAL /HPF
BASOPHILS # BLD AUTO: 0.02 K/UL
BASOPHILS NFR BLD: 0.4 %
BILIRUB SERPL-MCNC: 0.2 MG/DL
BILIRUB UR QL STRIP: NEGATIVE
BUN SERPL-MCNC: 11 MG/DL
CALCIUM SERPL-MCNC: 9.2 MG/DL
CHLORIDE SERPL-SCNC: 105 MMOL/L
CLARITY UR REFRACT.AUTO: ABNORMAL
CO2 SERPL-SCNC: 28 MMOL/L
COLOR UR AUTO: YELLOW
CREAT SERPL-MCNC: 0.66 MG/DL
DIFFERENTIAL METHOD: ABNORMAL
EOSINOPHIL # BLD AUTO: 0.1 K/UL
EOSINOPHIL NFR BLD: 2.6 %
ERYTHROCYTE [DISTWIDTH] IN BLOOD BY AUTOMATED COUNT: 12.9 %
EST. GFR  (AFRICAN AMERICAN): >60 ML/MIN/1.73 M^2
EST. GFR  (NON AFRICAN AMERICAN): >60 ML/MIN/1.73 M^2
GLUCOSE SERPL-MCNC: 95 MG/DL
GLUCOSE UR QL STRIP: NEGATIVE
HCT VFR BLD AUTO: 42.8 %
HGB BLD-MCNC: 14.3 G/DL
HGB UR QL STRIP: ABNORMAL
HYALINE CASTS UR QL AUTO: 0 /LPF
KETONES UR QL STRIP: ABNORMAL
LEUKOCYTE ESTERASE UR QL STRIP: ABNORMAL
LIPASE SERPL-CCNC: 54 U/L
LYMPHOCYTES # BLD AUTO: 1.5 K/UL
LYMPHOCYTES NFR BLD: 30.1 %
MCH RBC QN AUTO: 30.2 PG
MCHC RBC AUTO-ENTMCNC: 33.4 G/DL
MCV RBC AUTO: 90 FL
MICROSCOPIC COMMENT: ABNORMAL
MONOCYTES # BLD AUTO: 0.7 K/UL
MONOCYTES NFR BLD: 13.1 %
NEUTROPHILS # BLD AUTO: 2.7 K/UL
NEUTROPHILS NFR BLD: 53.6 %
NITRITE UR QL STRIP: NEGATIVE
PH UR STRIP: 6 [PH] (ref 5–8)
PLATELET # BLD AUTO: 250 K/UL
PMV BLD AUTO: 8.5 FL
POTASSIUM SERPL-SCNC: 3.7 MMOL/L
PROT SERPL-MCNC: 8 G/DL
PROT UR QL STRIP: ABNORMAL
RBC # BLD AUTO: 4.74 M/UL
RBC #/AREA URNS AUTO: 5 /HPF (ref 0–4)
SODIUM SERPL-SCNC: 138 MMOL/L
SP GR UR STRIP: 1.01 (ref 1–1.03)
URN SPEC COLLECT METH UR: ABNORMAL
UROBILINOGEN UR STRIP-ACNC: NEGATIVE EU/DL
WBC # BLD AUTO: 4.95 K/UL
WBC #/AREA URNS AUTO: 50 /HPF (ref 0–5)

## 2018-12-19 PROCEDURE — 87147 CULTURE TYPE IMMUNOLOGIC: CPT

## 2018-12-19 PROCEDURE — 87086 URINE CULTURE/COLONY COUNT: CPT

## 2018-12-19 PROCEDURE — 85025 COMPLETE CBC W/AUTO DIFF WBC: CPT

## 2018-12-19 PROCEDURE — 25000003 PHARM REV CODE 250: Performed by: PHYSICIAN ASSISTANT

## 2018-12-19 PROCEDURE — 87591 N.GONORRHOEAE DNA AMP PROB: CPT

## 2018-12-19 PROCEDURE — 96361 HYDRATE IV INFUSION ADD-ON: CPT

## 2018-12-19 PROCEDURE — 87088 URINE BACTERIA CULTURE: CPT

## 2018-12-19 PROCEDURE — 99284 EMERGENCY DEPT VISIT MOD MDM: CPT | Mod: 25

## 2018-12-19 PROCEDURE — 63600175 PHARM REV CODE 636 W HCPCS: Performed by: PHYSICIAN ASSISTANT

## 2018-12-19 PROCEDURE — 83690 ASSAY OF LIPASE: CPT

## 2018-12-19 PROCEDURE — 96365 THER/PROPH/DIAG IV INF INIT: CPT

## 2018-12-19 PROCEDURE — 81025 URINE PREGNANCY TEST: CPT

## 2018-12-19 PROCEDURE — 80053 COMPREHEN METABOLIC PANEL: CPT

## 2018-12-19 PROCEDURE — 81000 URINALYSIS NONAUTO W/SCOPE: CPT

## 2018-12-19 PROCEDURE — 96375 TX/PRO/DX INJ NEW DRUG ADDON: CPT

## 2018-12-19 RX ORDER — ONDANSETRON 4 MG/1
4 TABLET, FILM COATED ORAL EVERY 8 HOURS PRN
Qty: 12 TABLET | Refills: 0 | Status: SHIPPED | OUTPATIENT
Start: 2018-12-19

## 2018-12-19 RX ORDER — CIPROFLOXACIN 500 MG/1
500 TABLET ORAL
Status: COMPLETED | OUTPATIENT
Start: 2018-12-19 | End: 2018-12-19

## 2018-12-19 RX ORDER — CIPROFLOXACIN 500 MG/1
500 TABLET ORAL 2 TIMES DAILY
Qty: 19 TABLET | Refills: 0 | Status: SHIPPED | OUTPATIENT
Start: 2018-12-19 | End: 2018-12-29

## 2018-12-19 RX ORDER — ONDANSETRON 2 MG/ML
4 INJECTION INTRAMUSCULAR; INTRAVENOUS
Status: COMPLETED | OUTPATIENT
Start: 2018-12-19 | End: 2018-12-19

## 2018-12-19 RX ORDER — PSEUDOEPHEDRINE HCL 30 MG
30 TABLET ORAL EVERY 8 HOURS PRN
Qty: 30 TABLET | Refills: 0 | Status: SHIPPED | OUTPATIENT
Start: 2018-12-19 | End: 2018-12-29

## 2018-12-19 RX ORDER — DICYCLOMINE HYDROCHLORIDE 10 MG/1
20 CAPSULE ORAL
Status: COMPLETED | OUTPATIENT
Start: 2018-12-19 | End: 2018-12-19

## 2018-12-19 RX ORDER — DICYCLOMINE HYDROCHLORIDE 20 MG/1
20 TABLET ORAL 3 TIMES DAILY PRN
Qty: 21 TABLET | Refills: 0 | Status: SHIPPED | OUTPATIENT
Start: 2018-12-19 | End: 2019-01-18

## 2018-12-19 RX ORDER — SODIUM CHLORIDE 9 MG/ML
1000 INJECTION, SOLUTION INTRAVENOUS
Status: COMPLETED | OUTPATIENT
Start: 2018-12-19 | End: 2018-12-19

## 2018-12-19 RX ADMIN — SODIUM CHLORIDE 1000 ML: 0.9 INJECTION, SOLUTION INTRAVENOUS at 09:12

## 2018-12-19 RX ADMIN — CIPROFLOXACIN 500 MG: 500 TABLET, FILM COATED ORAL at 09:12

## 2018-12-19 RX ADMIN — DICYCLOMINE HYDROCHLORIDE 20 MG: 10 CAPSULE ORAL at 09:12

## 2018-12-19 RX ADMIN — ONDANSETRON 4 MG: 2 INJECTION INTRAMUSCULAR; INTRAVENOUS at 09:12

## 2018-12-19 RX ADMIN — CEFTRIAXONE 1 G: 1 INJECTION, SOLUTION INTRAVENOUS at 09:12

## 2018-12-19 RX ADMIN — LIDOCAINE HYDROCHLORIDE: 20 SOLUTION ORAL; TOPICAL at 09:12

## 2018-12-20 NOTE — ED PROVIDER NOTES
Encounter Date: 2018       History     Chief Complaint   Patient presents with    Nausea     pt reports nausea/burning sensation in stomach after eating with SOB/congestion x 1 week. Pt reports also reports clear/yellowish discharge x 1 week.     Patient is a 38-year-old female with history of pulmonary edema after  but no other acute findings.  She is presenting with complaint of mild diffuse intermittent abdominal cramping and burning sensation in the epigastric area with eating for the past week.  She also complains of nausea and diarrhea.  No melena or hematochezia.  No urinary symptoms.  In the triage she complained of shortness of breath but on further questioning she only feels short of breath when she tries to breathe through her nose because she has been having nasal congestion. No cough, chest pain or shortness of breath breathing through the mouth.  No edema. She reports some clear and yellowish mild vaginal discharge. No new sexual partners or concern for STD. No itching or lesions.           Review of patient's allergies indicates:  No Known Allergies  Past Medical History:   Diagnosis Date    Encounter for blood transfusion      Past Surgical History:   Procedure Laterality Date     SECTION       History reviewed. No pertinent family history.  Social History     Tobacco Use    Smoking status: Never Smoker    Smokeless tobacco: Never Used   Substance Use Topics    Alcohol use: Yes     Comment: occasionally    Drug use: No     Review of Systems   Constitutional: Positive for fatigue. Negative for activity change, appetite change, chills and fever.   HENT: Positive for congestion. Negative for ear pain, sore throat, trouble swallowing and voice change.    Respiratory: Negative for cough, shortness of breath and wheezing.    Cardiovascular: Negative for chest pain, palpitations and leg swelling.   Gastrointestinal: Positive for abdominal pain, diarrhea and nausea. Negative for  blood in stool and vomiting.   Genitourinary: Positive for dysuria and vaginal discharge. Negative for frequency, hematuria and vaginal bleeding.   Musculoskeletal: Negative for back pain, neck pain and neck stiffness.   Skin: Negative for rash and wound.   Neurological: Negative for dizziness, weakness, numbness and headaches.   All other systems reviewed and are negative.      Physical Exam     Initial Vitals [12/19/18 1912]   BP Pulse Resp Temp SpO2   131/85 96 20 98.1 °F (36.7 °C) 99 %      MAP       --         Physical Exam    Nursing note and vitals reviewed.  Constitutional: She appears well-developed and well-nourished. No distress (resting comfortably. ).   HENT:   Head: Normocephalic and atraumatic.   Mouth/Throat: Oropharynx is clear and moist.   Nasal mucosa inflamed.  Clear nasal congestion. No sinus tenderness.   Eyes: Conjunctivae and EOM are normal. Pupils are equal, round, and reactive to light.   Neck: Normal range of motion. Neck supple.   Cardiovascular: Normal rate, regular rhythm, normal heart sounds and intact distal pulses.   Pulmonary/Chest: Breath sounds normal. No respiratory distress. She has no wheezes. She has no rhonchi. She has no rales.   Abdominal: Soft. Bowel sounds are normal. She exhibits no distension. There is no tenderness. There is no rebound and no guarding.   No pelvic tenderness.  No CVA tenderness   Musculoskeletal: She exhibits no edema.   Lymphadenopathy:     She has no cervical adenopathy.   Neurological: She is alert and oriented to person, place, and time.   Skin: Skin is warm and dry. No rash noted.   Psychiatric: She has a normal mood and affect. Her behavior is normal. Judgment and thought content normal.         ED Course   Procedures  Labs Reviewed   URINALYSIS, REFLEX TO URINE CULTURE - Abnormal; Notable for the following components:       Result Value    Appearance, UA Cloudy (*)     Protein, UA 1+ (*)     Ketones, UA 3+ (*)     Occult Blood UA 1+ (*)      Leukocytes, UA 3+ (*)     All other components within normal limits    Narrative:     Preferred Collection Type->Urine, Clean Catch   URINALYSIS MICROSCOPIC - Abnormal; Notable for the following components:    RBC, UA 5 (*)     WBC, UA 50 (*)     Bacteria, UA Few (*)     All other components within normal limits    Narrative:     Preferred Collection Type->Urine, Clean Catch   CBC W/ AUTO DIFFERENTIAL - Abnormal; Notable for the following components:    MPV 8.5 (*)     All other components within normal limits   COMPREHENSIVE METABOLIC PANEL - Abnormal; Notable for the following components:    Anion Gap 5 (*)     All other components within normal limits   CULTURE, URINE   PREGNANCY TEST, URINE RAPID   LIPASE          Imaging Results    None          Medical Decision Making:   Clinical Tests:   Lab Tests: Ordered and Reviewed  The following lab test(s) were unremarkable: CBC, CMP, Urinalysis and UPT  Patient was treated with IV fluids, Rocephin, p.o. Cipro, Bentyl and Zofran was feeling much better prior to discharge. She will be discharged with a prescription for Cipro to cover both urinary tract infection and any possible GI bacterial infection although that part is likely viral in nature.  Prescription for Sudafed for the nasal congestion. GC and chlamydia are pending but unlikely per patient.  We will wait for the results and she can be treated if it is positive.  She does not have any evidence of PID or acute surgical abdomen.  No evidence of pneumonia or pulmonary edema on exam.  Follow-up with PCP within 2 days.  Return to the ED if worsen anyway                      Clinical Impression:   The primary encounter diagnosis was Gastroenteritis. Diagnoses of Acute cystitis without hematuria and Nasal congestion were also pertinent to this visit.      Disposition:   Disposition: Discharged                        PHUONG Bassett  12/19/18 1528

## 2018-12-20 NOTE — ED TRIAGE NOTES
pt reports nausea/burning sensation in stomach after eating with SOB/congestion x 1 week. Pt reports also reports clear/yellowish discharge x 1 week.

## 2018-12-20 NOTE — DISCHARGE INSTRUCTIONS
Follow up with your PCP within 2 days for recheck. Return to the ED for severe pain, black or bloody stool, vomiting and unable to keep fluids down, chest pain, shortness of breath when breathing through your mouth or worsen any way

## 2018-12-21 LAB
BACTERIA UR CULT: NORMAL
C TRACH DNA SPEC QL NAA+PROBE: NOT DETECTED
N GONORRHOEA DNA SPEC QL NAA+PROBE: NOT DETECTED